# Patient Record
(demographics unavailable — no encounter records)

---

## 2024-11-14 NOTE — HISTORY OF PRESENT ILLNESS
[FreeTextEntry1] : 84 y/o initially seen in the hospital emergently for gross hematuria and clot retention the patient underwent large volume clot evacuation and bladder tumor resection  9/2022 the lesion in question was high grade and invasive into the lamina propria muscle was not present she underwent re-staging TURBT - 11/2022 Bladder biopsy: - Miniscule focus of low-grade, noninvasive papillary urothelial neoplasm (see comment below). -  Deep smooth muscle is present and is uninvolved by the tumor.  she did not receive intravesical therapy  returns now having undergone a bladder tumor resection  1.  Bladder tumor: -Papillary urothelial carcinoma, low-grade, noninvasive (pTa). -No smooth muscle is identified in this specimen.  2.  Bladder tumor base: - Papillary urothelial carcinoma, low-grade, noninvasive (pTa). -No smooth muscle is identified in the specimen. [Hematuria - Gross] : gross hematuria

## 2024-11-14 NOTE — LETTER BODY
[Dear  ___] : Dear  [unfilled], [Consult Letter:] : I had the pleasure of evaluating your patient, [unfilled]. [Please see my note below.] : Please see my note below. [Sincerely,] : Sincerely, [FreeTextEntry3] : Ritchie Mcneal MD, FACS

## 2024-11-14 NOTE — ASSESSMENT
[FreeTextEntry1] : 84 y/o initially seen in the hospital emergently for gross hematuria and clot retention the patient underwent large volume clot evacuation and bladder tumor resection  9/2022 the lesion in question was high grade and invasive into the lamina propria muscle was not present she underwent re-staging TURBT - 11/2022 Bladder biopsy: - Miniscule focus of low-grade, noninvasive papillary urothelial neoplasm (see comment below). -  Deep smooth muscle is present and is uninvolved by the tumor.  she did not receive intravesical therapy  returns now having undergone a bladder tumor resection  1.  Bladder tumor: -Papillary urothelial carcinoma, low-grade, noninvasive (pTa). -No smooth muscle is identified in this specimen.  2.  Bladder tumor base: - Papillary urothelial carcinoma, low-grade, noninvasive (pTa). -No smooth muscle is identified in the specimen.  Plan  84 y/o with recurrent bladder cancer - initially Stage I - HG no intravesical therapies now with LG stage 0  - all questions answered - pathology reviewed - cystoscopy in 3 months - urine studies prior

## 2024-11-14 NOTE — PHYSICAL EXAM

## 2025-06-01 NOTE — ASSESSMENT
[FreeTextEntry1] : Benign breast cancer surveillance examination.  The skin changes as described are chronic and stable and all the result of her prior breast radiotherapy.  Not believe that a punch biopsy is needed but the patient will have a 6-month follow-up diagnostic imaging of this breast and return thereafter for reexamination.  She understands and agrees and will contact me sooner should she has any other symptoms or changes.  All her questions were answered and she is happy with the assessment and plan.

## 2025-06-01 NOTE — HISTORY OF PRESENT ILLNESS
[de-identified] : The patient returns for her breast cancer surveillance examination.  She looks well overall and notes no new symptoms or changes in either breast region.  She is somewhat overdue as she was last seen in December 2023.  Left breast WLE/SLNB/WBRT July 2017.

## 2025-06-01 NOTE — DATA REVIEWED
[FreeTextEntry1] : Her recent bilateral mammogram with additional imaging of the left breast showed skin thickening in the left breast for which clinical correlation was advised regarding the possible need for a punch biopsy.  The images were reviewed in detail and discussed with the attending radiologist.

## 2025-06-01 NOTE — PHYSICAL EXAM
[de-identified] : No adenopathy [de-identified] : No nipple discharge, nipple retraction, suspicious skin changes noted bilaterally there is mild peau d'orange of the lower half of the left breast which appears unchanged from prior exams and is unchanged as per the patient's recollection also.  There is no breast erythema or tenderness.  There is also mild chronic indentation of the left breast lumpectomy incision, also stable and not suspect.  No palpable masses or suspicious areas are noted in either breast.  No axillary adenopathy bilaterally